# Patient Record
Sex: FEMALE | Race: WHITE | NOT HISPANIC OR LATINO | Employment: OTHER | URBAN - METROPOLITAN AREA
[De-identification: names, ages, dates, MRNs, and addresses within clinical notes are randomized per-mention and may not be internally consistent; named-entity substitution may affect disease eponyms.]

---

## 2018-04-26 ENCOUNTER — TELEPHONE (OUTPATIENT)
Dept: OTHER | Facility: CLINIC | Age: 58
End: 2018-04-26

## 2018-04-26 NOTE — TELEPHONE ENCOUNTER
4/26/2018    Call Regarding Onboarding are Choices    Attempt 1    Message on voicemail     Comments: no dep      Outreach   SV

## 2022-05-10 ENCOUNTER — TRANSFERRED RECORDS (OUTPATIENT)
Dept: HEALTH INFORMATION MANAGEMENT | Facility: CLINIC | Age: 62
End: 2022-05-10

## 2024-03-01 ENCOUNTER — TRANSFERRED RECORDS (OUTPATIENT)
Dept: HEALTH INFORMATION MANAGEMENT | Facility: CLINIC | Age: 64
End: 2024-03-01
Payer: COMMERCIAL

## 2024-03-01 ENCOUNTER — MEDICAL CORRESPONDENCE (OUTPATIENT)
Dept: HEALTH INFORMATION MANAGEMENT | Facility: CLINIC | Age: 64
End: 2024-03-01
Payer: COMMERCIAL

## 2024-03-04 ENCOUNTER — TRANSCRIBE ORDERS (OUTPATIENT)
Dept: OTHER | Age: 64
End: 2024-03-04

## 2024-03-04 DIAGNOSIS — H47.20 OPTIC ATROPHY: Primary | ICD-10-CM

## 2024-05-29 NOTE — TELEPHONE ENCOUNTER
FUTURE VISIT INFORMATION      FUTURE VISIT INFORMATION:  Date: 9/4/24  Time: 9:20am  Location: [facility]  REFERRAL INFORMATION:  Referring provider:  Liane Ann MD   Referring providers clinic:  [facility]   Reason for visit/diagnosis  progressive optic neuropathy both eyes per pt, referred by Dr Liane Ann from [facility]     RECORDS REQUESTED FROM:       Clinic name Comments Records Status Imaging Status   [facility]  Recs scanned into chart under 3/1/24 EPIC

## 2024-06-04 ENCOUNTER — PRE VISIT (OUTPATIENT)
Dept: OPHTHALMOLOGY | Facility: CLINIC | Age: 64
End: 2024-06-04
Payer: COMMERCIAL

## 2024-09-04 ENCOUNTER — OFFICE VISIT (OUTPATIENT)
Dept: OPHTHALMOLOGY | Facility: CLINIC | Age: 64
End: 2024-09-04
Attending: OPHTHALMOLOGY
Payer: COMMERCIAL

## 2024-09-04 ENCOUNTER — TELEPHONE (OUTPATIENT)
Dept: OPHTHALMOLOGY | Facility: CLINIC | Age: 64
End: 2024-09-04

## 2024-09-04 DIAGNOSIS — H25.013 CORTICAL SENILE CATARACT OF BOTH EYES: Primary | ICD-10-CM

## 2024-09-04 DIAGNOSIS — H47.20 OPTIC ATROPHY: ICD-10-CM

## 2024-09-04 DIAGNOSIS — H53.40 VISUAL FIELD DEFECT: Primary | ICD-10-CM

## 2024-09-04 PROCEDURE — 99204 OFFICE O/P NEW MOD 45 MIN: CPT | Performed by: OPHTHALMOLOGY

## 2024-09-04 PROCEDURE — 92083 EXTENDED VISUAL FIELD XM: CPT | Performed by: OPHTHALMOLOGY

## 2024-09-04 ASSESSMENT — CONF VISUAL FIELD
OS_INFERIOR_NASAL_RESTRICTION: 0
METHOD: COUNTING FINGERS
OD_INFERIOR_TEMPORAL_RESTRICTION: 0
OD_INFERIOR_NASAL_RESTRICTION: 0
OS_SUPERIOR_TEMPORAL_RESTRICTION: 0
OS_INFERIOR_TEMPORAL_RESTRICTION: 0
OS_SUPERIOR_NASAL_RESTRICTION: 0
OS_NORMAL: 1
OD_SUPERIOR_TEMPORAL_RESTRICTION: 0
OD_NORMAL: 1
OD_SUPERIOR_NASAL_RESTRICTION: 0

## 2024-09-04 ASSESSMENT — VISUAL ACUITY
METHOD: SNELLEN - LINEAR
OS_SC: 20/20
OD_SC: 20/20
OD_SC+: -2

## 2024-09-04 ASSESSMENT — TONOMETRY
OD_IOP_MMHG: 10
OS_IOP_MMHG: 10
IOP_METHOD: ICARE

## 2024-09-04 ASSESSMENT — EXTERNAL EXAM - RIGHT EYE: OD_EXAM: NORMAL

## 2024-09-04 ASSESSMENT — EXTERNAL EXAM - LEFT EYE: OS_EXAM: NORMAL

## 2024-09-04 ASSESSMENT — CUP TO DISC RATIO
OS_RATIO: 0.5
OD_RATIO: 0.5

## 2024-09-04 ASSESSMENT — SLIT LAMP EXAM - LIDS
COMMENTS: NORMAL
COMMENTS: NORMAL
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
## 2024-09-04 NOTE — TELEPHONE ENCOUNTER
Spoke with patient regarding scheduling a Return in about 6 months (around 3/4/2025) for GTOP, v/t/d/rnfl. Scheduled patient accordingly and [facility]
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
## 2024-09-12 ENCOUNTER — TELEPHONE (OUTPATIENT)
Dept: OPHTHALMOLOGY | Facility: CLINIC | Age: 64
End: 2024-09-12
Payer: COMMERCIAL

## 2024-09-12 NOTE — TELEPHONE ENCOUNTER
Called and LVM     Sent a [employer] message to clarify her questions for Dr. Cotto or clinic staff     Alyssa Adamson Communication Facilitator on 9/12/2024 at 3:29 PM

## 2024-09-12 NOTE — TELEPHONE ENCOUNTER
[facility] Call Center    Phone Message    May a detailed message be left on voicemail: yes     Reason for Call: Other: please contact pt as she is wanting to confirm what OCT's Dr Cotto has received and to update what yrs pt did not have any completed. Thank you     Action Taken: Message routed to:  [facility]    Travel Screening: Not Applicable     Date of Service:

## 2024-10-19 ENCOUNTER — HEALTH MAINTENANCE LETTER (OUTPATIENT)
Age: 64
End: 2024-10-19

## 2025-01-25 ENCOUNTER — HEALTH MAINTENANCE LETTER (OUTPATIENT)
Age: 65
End: 2025-01-25

## 2025-04-23 ENCOUNTER — OFFICE VISIT (OUTPATIENT)
Dept: OPHTHALMOLOGY | Facility: CLINIC | Age: 65
End: 2025-04-23
Payer: COMMERCIAL

## 2025-04-23 DIAGNOSIS — H47.20 OPTIC ATROPHY: Primary | ICD-10-CM

## 2025-04-23 ASSESSMENT — CONF VISUAL FIELD
OS_INFERIOR_NASAL_RESTRICTION: 0
OD_SUPERIOR_NASAL_RESTRICTION: 0
OD_INFERIOR_NASAL_RESTRICTION: 0
OD_INFERIOR_TEMPORAL_RESTRICTION: 0
OD_SUPERIOR_TEMPORAL_RESTRICTION: 0
OS_SUPERIOR_TEMPORAL_RESTRICTION: 0
OS_INFERIOR_TEMPORAL_RESTRICTION: 0
METHOD: COUNTING FINGERS
OS_NORMAL: 1
OD_NORMAL: 1
OS_SUPERIOR_NASAL_RESTRICTION: 0

## 2025-04-23 ASSESSMENT — CUP TO DISC RATIO
OD_RATIO: 0.5
OS_RATIO: 0.5

## 2025-04-23 ASSESSMENT — VISUAL ACUITY
OS_SC+: -1
OD_SC+: -2
OS_SC: 20/20
METHOD: SNELLEN - LINEAR
OD_SC: 20/20

## 2025-04-23 ASSESSMENT — EXTERNAL EXAM - RIGHT EYE: OD_EXAM: NORMAL

## 2025-04-23 ASSESSMENT — TONOMETRY
IOP_METHOD: ICARE
OD_IOP_MMHG: 8
OS_IOP_MMHG: 9

## 2025-04-23 ASSESSMENT — EXTERNAL EXAM - LEFT EYE: OS_EXAM: NORMAL

## 2025-04-23 ASSESSMENT — SLIT LAMP EXAM - LIDS
COMMENTS: NORMAL
COMMENTS: NORMAL

## 2025-04-23 NOTE — NURSING NOTE
Chief Complaints and History of Present Illnesses   Patient presents with    Follow Up     Optic atrophy     Chief Complaint(s) and History of Present Illness(es)       Follow Up              Laterality: both eyes    Course: stable    Associated symptoms: fatigue (with computer work) and photophobia (mild, intermittent).  Negative for dryness and eye pain    Pain scale: 0/10    Comments: Optic atrophy              Comments    She states that her vision has seemed stable in both eyes since her last eye exam.  Patient denies having any eye discomfort.    Clau Rose, COT 7:59 AM  April 23, 2025

## 2025-04-23 NOTE — PROGRESS NOTES
Michelle Pickard is a 64 year old female with the following diagnoses:   1. Optic atrophy - Left Eye         HPI  Patient follows for bilateral optic atrophy, which began on 2022 or so.  Last visit was September 2024.  At that time, she had retinal nerve fiber layer thinning both eyes.  Today, patient reports that her vision is a little better if anything.      Exam:  Visual acuity 20/20 right eye 20/20 left eye.  Color vision is 11/11 both eyes.  Pupils: normal.  Intraocular pressure 8 right eye and 9 left eye.  She has cortical cataracts both eyes.  Her optic nerves appear normal.     Tests ordered and interpreted today:    OCT Optic Nerve RNFL Spectralis OU (both eyes)          Performed by: MELODY   . Patient cooperation: Reliable   .     Right Eye  Reliability of the test: Good   . Test Findings: Normal   . Interpretation: Normal   . Plan: Monitor   . Interval: Initial   .     Left Eye  Reliability of the test: Good   . Test Findings: Abnormal   . Interpretation: Superior loss   . Plan: Monitor   . Interval: Initial   .          Glaucoma Top OU          Performed by: TL   .   Good fix BE, not dilated.     Right Eye  Reliability of the test: Good   . Findings: Nonspecific defect   . Interpretation: Normal   . Interval: Initial   .     Left Eye  Reliability of the test: Good   . Findings: Nasal step   . Interpretation: Abnormal   . Interval: Initial   .              It is my impression that patient has left optic atrophy. This has been stable over the course of 3 years.  We discussed that we often recommend an MRI of the orbit in patients with unilateral optic atrophy.  However, her retinal nerve fiber layer has been stable for so long that we agreed to careful observation.  Follow up 1 year sooner as needed for worsening symptoms.           Attending Physician Attestation:  Complete documentation of historical and exam elements from today's encounter can be found in the full encounter summary report (not  reduplicated in this progress note).  I personally obtained the chief complaint(s) and history of present illness.  I confirmed and edited as necessary the review of systems, past medical/surgical history, family history, social history, and examination findings as documented by others; and I examined the patient myself.  I personally reviewed the relevant tests, images, and reports as documented above.  I formulated and edited as necessary the assessment and plan and discussed the findings and management plan with the patient and family. - Adam Cotto MD

## 2025-04-23 NOTE — LETTER
2025     RE:  :  MRN: Michelle Pickard  1960  [id number]     Dear Dr. Ann:     Your patient,Michelle Pickard, returned for neuro-ophthalmic follow up. My assessment and plan are below.  For further details, please see my attached clinic note.      Michelle Pickard is a 64 year old female with the following diagnoses:   1. Optic atrophy - Left Eye       HPI  Patient follows for bilateral optic atrophy, which began on  or so.  Last visit was 2024.  At that time, she had retinal nerve fiber layer thinning both eyes.  Today, patient reports that her vision is a little better if anything.      Exam:  Visual acuity 20/20 right eye 20/20 left eye.  Color vision is 11/11 both eyes.  Pupils: normal.  Intraocular pressure 8 right eye and 9 left eye.  She has cortical cataracts both eyes.  Her optic nerves appear normal.     Tests ordered and interpreted today:    OCT Optic Nerve RNFL Spectralis OU (both eyes)    Performed by: STEPHANIES   . Patient cooperation: Reliable   .     Right Eye  Reliability of the test: Good   . Test Findings: Normal   . Interpretation: Normal   . Plan: Monitor   . Interval: Initial   .   Left Eye  Reliability of the test: Good   . Test Findings: Abnormal   . Interpretation: Superior loss   . Plan: Monitor   . Interval: Initial   Glaucoma Top OU    Performed by: TL   .   Good fix BE, not dilated.     Right Eye  Reliability of the test: Good   . Findings: Nonspecific defect   . Interpretation: Normal   . Interval: Initial   .   Left Eye  Reliability of the test: Good   . Findings: Nasal step   . Interpretation: Abnormal   . Interval: Initial     It is my impression that patient has left optic atrophy. This has been stable over the course of 3 years.  We discussed that we often recommend an MRI of the orbit in patients with unilateral optic atrophy.  However, her retinal nerve fiber layer has been stable for so long that we agreed to careful observation.  Follow up 1 year sooner as  needed for worsening symptoms.        Again, thank you for allowing me to participate in the care of your patient.      Sincerely,      Adam Cotto MD  Professor  Director of Neuro-Ophthalmology  [employer] Endowed Chair  Departments of Ophthalmology, Neurology, and Neurosurgery  [address]  [address]  T - [phone]  F - [phone]  [email or web address]      CC: Liane Ann MD  [facility]  [address] [place] [id number]  Via Fax: [phone]